# Patient Record
Sex: MALE | Race: WHITE
[De-identification: names, ages, dates, MRNs, and addresses within clinical notes are randomized per-mention and may not be internally consistent; named-entity substitution may affect disease eponyms.]

---

## 2018-07-20 ENCOUNTER — HOSPITAL ENCOUNTER (EMERGENCY)
Dept: HOSPITAL 19 - COL.ER | Age: 53
Discharge: HOME | End: 2018-07-20
Payer: COMMERCIAL

## 2018-07-20 VITALS — HEART RATE: 64 BPM | SYSTOLIC BLOOD PRESSURE: 126 MMHG | DIASTOLIC BLOOD PRESSURE: 80 MMHG

## 2018-07-20 VITALS — HEIGHT: 67.99 IN | WEIGHT: 170.42 LBS | BODY MASS INDEX: 25.83 KG/M2

## 2018-07-20 VITALS — TEMPERATURE: 98 F

## 2018-07-20 DIAGNOSIS — R42: Primary | ICD-10-CM

## 2018-07-20 DIAGNOSIS — R06.4: ICD-10-CM

## 2018-07-20 LAB
ALBUMIN SERPL-MCNC: 3.7 GM/DL (ref 3.5–5)
ALP SERPL-CCNC: 106 U/L (ref 50–136)
ALT SERPL-CCNC: 36 U/L (ref 21–72)
ANION GAP SERPL CALC-SCNC: 10 MMOL/L (ref 7–16)
AST SERPL-CCNC: 24 U/L (ref 15–37)
BASOPHILS # BLD: 0 10*3/UL (ref 0–0.2)
BASOPHILS NFR BLD AUTO: 0.8 % (ref 0–2)
BILIRUB SERPL-MCNC: 0.5 MG/DL (ref 0–1)
BUN SERPL-MCNC: 20 MG/DL (ref 9–20)
CALCIUM SERPL-MCNC: 8.5 MG/DL (ref 8.4–10.2)
CHLORIDE SERPL-SCNC: 105 MMOL/L (ref 98–107)
CO2 SERPL-SCNC: 25 MMOL/L (ref 22–30)
CREAT SERPL-SCNC: 1.03 MG/DL (ref 0.66–1.25)
EOSINOPHIL # BLD: 0.1 10*3/UL (ref 0–0.7)
EOSINOPHIL NFR BLD: 2.3 % (ref 0–4)
ERYTHROCYTE [DISTWIDTH] IN BLOOD BY AUTOMATED COUNT: 12.8 % (ref 11.5–14.5)
GLUCOSE SERPL-MCNC: 94 MG/DL (ref 74–106)
GRANULOCYTES # BLD AUTO: 60.5 % (ref 42.2–75.2)
HCT VFR BLD AUTO: 45.2 % (ref 42–52)
HGB BLD-MCNC: 15.7 G/DL (ref 13.5–18)
LYMPHOCYTES # BLD: 1.3 10*3/UL (ref 1.2–3.4)
LYMPHOCYTES NFR BLD: 27.1 % (ref 20–51)
MCH RBC QN AUTO: 30 PG (ref 27–31)
MCHC RBC AUTO-ENTMCNC: 35 G/DL (ref 33–37)
MCV RBC AUTO: 85 FL (ref 80–100)
MONOCYTES # BLD: 0.4 10*3/UL (ref 0.1–0.6)
MONOCYTES NFR BLD AUTO: 9.1 % (ref 1.7–9.3)
NEUTROPHILS # BLD: 2.9 10*3/UL (ref 1.4–6.5)
PLATELET # BLD AUTO: 197 K/MM3 (ref 130–400)
PMV BLD AUTO: 9.9 FL (ref 7.4–10.4)
POTASSIUM SERPL-SCNC: 3.7 MMOL/L (ref 3.4–5)
PROT SERPL-MCNC: 6.3 GM/DL (ref 6.4–8.2)
RBC # BLD AUTO: 5.3 M/MM3 (ref 4.2–5.6)
SODIUM SERPL-SCNC: 139 MMOL/L (ref 137–145)
TROPONIN I SERPL-MCNC: < 0.012 NG/ML (ref 0–0.03)

## 2020-02-06 ENCOUNTER — HOSPITAL ENCOUNTER (EMERGENCY)
Dept: HOSPITAL 19 - COL.ER | Age: 55
Discharge: HOME | End: 2020-02-06
Payer: COMMERCIAL

## 2020-02-06 VITALS — WEIGHT: 169.32 LBS | BODY MASS INDEX: 25.66 KG/M2 | HEIGHT: 67.99 IN

## 2020-02-06 VITALS — TEMPERATURE: 97.7 F | DIASTOLIC BLOOD PRESSURE: 71 MMHG | SYSTOLIC BLOOD PRESSURE: 129 MMHG

## 2020-02-06 VITALS — HEART RATE: 79 BPM

## 2020-02-06 DIAGNOSIS — Z79.51: ICD-10-CM

## 2020-02-06 DIAGNOSIS — B08.5: Primary | ICD-10-CM

## 2020-02-06 LAB — S PYO AG SPEC QL: NEGATIVE

## 2020-06-03 ENCOUNTER — HOSPITAL ENCOUNTER (EMERGENCY)
Dept: HOSPITAL 19 - COL.ER | Age: 55
Discharge: HOME | End: 2020-06-03
Payer: COMMERCIAL

## 2020-06-03 VITALS — BODY MASS INDEX: 26.56 KG/M2 | HEIGHT: 67.99 IN | WEIGHT: 175.27 LBS

## 2020-06-03 VITALS — TEMPERATURE: 99.2 F | SYSTOLIC BLOOD PRESSURE: 160 MMHG | DIASTOLIC BLOOD PRESSURE: 90 MMHG

## 2020-06-03 VITALS — HEART RATE: 66 BPM
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
## 2020-06-03 DIAGNOSIS — Z98.890: ICD-10-CM
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
## 2020-06-03 DIAGNOSIS — S61.311A: Primary | ICD-10-CM

## 2020-06-03 DIAGNOSIS — Z23: ICD-10-CM

## 2021-09-16 ENCOUNTER — HOSPITAL ENCOUNTER (OUTPATIENT)
Dept: HOSPITAL 19 - SDCO | Age: 56
End: 2021-09-16
Attending: FAMILY MEDICINE
Payer: COMMERCIAL

## 2021-09-16 VITALS — HEART RATE: 62 BPM | SYSTOLIC BLOOD PRESSURE: 106 MMHG | DIASTOLIC BLOOD PRESSURE: 70 MMHG

## 2021-09-16 VITALS — DIASTOLIC BLOOD PRESSURE: 79 MMHG | HEART RATE: 68 BPM | SYSTOLIC BLOOD PRESSURE: 111 MMHG | TEMPERATURE: 97.3 F

## 2021-09-16 VITALS — DIASTOLIC BLOOD PRESSURE: 77 MMHG | TEMPERATURE: 97.3 F | HEART RATE: 70 BPM | SYSTOLIC BLOOD PRESSURE: 124 MMHG

## 2021-09-16 VITALS — DIASTOLIC BLOOD PRESSURE: 70 MMHG | HEART RATE: 62 BPM | SYSTOLIC BLOOD PRESSURE: 113 MMHG

## 2021-09-16 VITALS — BODY MASS INDEX: 26.8 KG/M2 | HEIGHT: 67.99 IN | WEIGHT: 176.81 LBS

## 2021-09-16 DIAGNOSIS — G47.00: ICD-10-CM

## 2021-09-16 DIAGNOSIS — M81.0: ICD-10-CM

## 2021-09-16 DIAGNOSIS — K21.9: ICD-10-CM

## 2021-09-16 DIAGNOSIS — Z79.899: ICD-10-CM

## 2021-09-16 DIAGNOSIS — M51.36: ICD-10-CM

## 2021-09-16 DIAGNOSIS — J45.909: ICD-10-CM

## 2021-09-16 DIAGNOSIS — Z86.010: ICD-10-CM

## 2021-09-16 DIAGNOSIS — Z12.11: Primary | ICD-10-CM

## 2021-09-16 NOTE — NUR
0910 PATIENT ARRIVES TO [facility] [facility]. AMBULATED TO CHAIR WITH 1:1 ASSIST. WARM
BLANKET GIVEN FOR COMFORT. VSS. PATIENT DENIES COMPLAINT. REQUESTS TABATHA AND
FARIDA AGUILAR. PATIENT'S SON AT CHAIRSIDE.
 
0925 PATIENT ALERT. TOLERATING PO WELL. DENIES COMPLAINT. VSS.

## 2021-09-16 NOTE — NUR
0940 PATIENT ALERT. VSS. TOLERATED PO WELL. IV TO R HAND DISCONTINUED.
CATHETER INTACT. PATIENT TOLERATED WELL. WASTED 550 ML LR. D/C INSTRUCTIONS,
VERBAL AND WRITTEN GIVEN TO PATIENT. QUESTIONS INVITED AND ANSWERED. PATIENT
VERBALIZED UNDERSTANDING.
 
7726 PATIENT D/C'D TO POV VIA W/C WITH SON. PATIENT HAS PERSONAL BELONGINGS
AND PATIENT INFORMATION.

## 2022-05-26 ENCOUNTER — HOSPITAL ENCOUNTER (EMERGENCY)
Dept: HOSPITAL 19 - COL.ER | Age: 57
Discharge: HOME | End: 2022-05-26
Payer: COMMERCIAL

## 2022-05-26 VITALS — TEMPERATURE: 98.6 F | DIASTOLIC BLOOD PRESSURE: 78 MMHG | SYSTOLIC BLOOD PRESSURE: 135 MMHG

## 2022-05-26 VITALS — HEIGHT: 67.99 IN | BODY MASS INDEX: 26.56 KG/M2 | WEIGHT: 175.27 LBS

## 2022-05-26 VITALS — HEART RATE: 62 BPM

## 2022-05-26 DIAGNOSIS — Z28.310: ICD-10-CM
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
## 2022-05-26 DIAGNOSIS — S91.311A: Primary | ICD-10-CM
